# Patient Record
(demographics unavailable — no encounter records)

---

## 2024-12-03 NOTE — HISTORY OF PRESENT ILLNESS
[de-identified] : VALDEZ RODRIGUEZ  is a 77 year woman with a history of impacted cerumen AD noted by her internist. She complains of months of imbalance/unsteadiness.

## 2024-12-03 NOTE — ASSESSMENT
[FreeTextEntry1] : VALDEZ RODRIGUEZ had impacted cerumen removed AD. Audiogram shows bilateral SNHL with normal tymps.  I referred her for Vestibular therapy at [facility].